# Patient Record
Sex: FEMALE | Race: OTHER | URBAN - METROPOLITAN AREA
[De-identification: names, ages, dates, MRNs, and addresses within clinical notes are randomized per-mention and may not be internally consistent; named-entity substitution may affect disease eponyms.]

---

## 2017-01-10 ENCOUNTER — IMPORTED ENCOUNTER (OUTPATIENT)
Dept: URBAN - METROPOLITAN AREA CLINIC 9 | Facility: CLINIC | Age: 82
End: 2017-01-10

## 2017-02-16 ENCOUNTER — IMPORTED ENCOUNTER (OUTPATIENT)
Dept: URBAN - METROPOLITAN AREA CLINIC 9 | Facility: CLINIC | Age: 82
End: 2017-02-16

## 2017-03-02 ENCOUNTER — IMPORTED ENCOUNTER (OUTPATIENT)
Dept: URBAN - METROPOLITAN AREA CLINIC 9 | Facility: CLINIC | Age: 82
End: 2017-03-02

## 2017-03-30 ENCOUNTER — IMPORTED ENCOUNTER (OUTPATIENT)
Dept: URBAN - METROPOLITAN AREA CLINIC 9 | Facility: CLINIC | Age: 82
End: 2017-03-30

## 2017-05-04 ENCOUNTER — IMPORTED ENCOUNTER (OUTPATIENT)
Dept: URBAN - METROPOLITAN AREA CLINIC 9 | Facility: CLINIC | Age: 82
End: 2017-05-04

## 2017-08-02 ENCOUNTER — IMPORTED ENCOUNTER (OUTPATIENT)
Dept: URBAN - METROPOLITAN AREA CLINIC 9 | Facility: CLINIC | Age: 82
End: 2017-08-02

## 2017-12-07 ENCOUNTER — IMPORTED ENCOUNTER (OUTPATIENT)
Dept: URBAN - METROPOLITAN AREA CLINIC 9 | Facility: CLINIC | Age: 82
End: 2017-12-07

## 2018-02-14 ENCOUNTER — IMPORTED ENCOUNTER (OUTPATIENT)
Dept: URBAN - METROPOLITAN AREA CLINIC 9 | Facility: CLINIC | Age: 83
End: 2018-02-14

## 2018-06-28 ENCOUNTER — IMPORTED ENCOUNTER (OUTPATIENT)
Dept: URBAN - METROPOLITAN AREA CLINIC 9 | Facility: CLINIC | Age: 83
End: 2018-06-28

## 2018-08-16 ENCOUNTER — IMPORTED ENCOUNTER (OUTPATIENT)
Dept: URBAN - METROPOLITAN AREA CLINIC 9 | Facility: CLINIC | Age: 83
End: 2018-08-16

## 2018-09-28 ENCOUNTER — IMPORTED ENCOUNTER (OUTPATIENT)
Dept: URBAN - METROPOLITAN AREA CLINIC 9 | Facility: CLINIC | Age: 83
End: 2018-09-28

## 2018-12-06 ENCOUNTER — IMPORTED ENCOUNTER (OUTPATIENT)
Dept: URBAN - METROPOLITAN AREA CLINIC 9 | Facility: CLINIC | Age: 83
End: 2018-12-06

## 2019-02-11 ENCOUNTER — IMPORTED ENCOUNTER (OUTPATIENT)
Dept: URBAN - METROPOLITAN AREA CLINIC 9 | Facility: CLINIC | Age: 84
End: 2019-02-11

## 2019-03-05 ENCOUNTER — IMPORTED ENCOUNTER (OUTPATIENT)
Dept: URBAN - METROPOLITAN AREA CLINIC 9 | Facility: CLINIC | Age: 84
End: 2019-03-05

## 2019-06-06 ENCOUNTER — IMPORTED ENCOUNTER (OUTPATIENT)
Dept: URBAN - METROPOLITAN AREA CLINIC 9 | Facility: CLINIC | Age: 84
End: 2019-06-06

## 2019-08-12 ENCOUNTER — IMPORTED ENCOUNTER (OUTPATIENT)
Dept: URBAN - METROPOLITAN AREA CLINIC 9 | Facility: CLINIC | Age: 84
End: 2019-08-12

## 2019-09-13 ENCOUNTER — IMPORTED ENCOUNTER (OUTPATIENT)
Dept: URBAN - METROPOLITAN AREA CLINIC 9 | Facility: CLINIC | Age: 84
End: 2019-09-13

## 2019-12-13 ENCOUNTER — IMPORTED ENCOUNTER (OUTPATIENT)
Dept: URBAN - METROPOLITAN AREA CLINIC 9 | Facility: CLINIC | Age: 84
End: 2019-12-13

## 2020-02-17 ENCOUNTER — IMPORTED ENCOUNTER (OUTPATIENT)
Dept: URBAN - METROPOLITAN AREA CLINIC 9 | Facility: CLINIC | Age: 85
End: 2020-02-17

## 2020-05-15 ENCOUNTER — IMPORTED ENCOUNTER (OUTPATIENT)
Dept: URBAN - METROPOLITAN AREA CLINIC 9 | Facility: CLINIC | Age: 85
End: 2020-05-15

## 2020-08-24 ENCOUNTER — IMPORTED ENCOUNTER (OUTPATIENT)
Dept: URBAN - METROPOLITAN AREA CLINIC 9 | Facility: CLINIC | Age: 85
End: 2020-08-24

## 2020-09-04 ENCOUNTER — IMPORTED ENCOUNTER (OUTPATIENT)
Dept: URBAN - METROPOLITAN AREA CLINIC 9 | Facility: CLINIC | Age: 85
End: 2020-09-04

## 2020-09-15 NOTE — PATIENT DISCUSSION
The patient was informed that with Symfony blended vision, the near focal point will be at approximately 20 inches with the first eye. Near vision will be achieved with surgery on the second eye. Side effects, specifically halos, reduced contrast, and a 1 in 500 exchange rate due to failure to adapt to the lens were discussed as was the need for enhancement in some cases. The patient elects Symfony OD ( +/- Toric), goal of emmetropia.

## 2020-09-22 NOTE — PATIENT DISCUSSION
Left message on voice mail regarding lab results below per  Renal function is stable.  Triglycerides are elevated and HDL is low.  They can improve with a decrease in simple carbs and an increase in exercise.  Will recheck them in one year-lab orders placed.    Recommended observation.

## 2020-09-28 NOTE — PATIENT DISCUSSION
Patient was due for follow up colonoscopy back in 2012.  Reminders were sent to patient.  An appointment was not made for procedure to be done. Order for colonoscopy has since  and was canceled.    Per TIO.ARIANNA Ramos, an order has been re-entered for a follow up colonoscopy to be done.  Letter mailed requesting that pateint contact Dr Ramos's  to make an appt for the procedure to be done   Recommended observation.

## 2020-10-05 NOTE — PATIENT DISCUSSION
Cataract surgery has been performed in the first eye and activities of daily living are still impaired. The patient would like to proceed with cataract surgery in the second eye as scheduled. The patient elects TMF +3.25 (+/- toric) OS, goal of emmetropia.

## 2020-11-13 ENCOUNTER — IMPORTED ENCOUNTER (OUTPATIENT)
Dept: URBAN - METROPOLITAN AREA CLINIC 9 | Facility: CLINIC | Age: 85
End: 2020-11-13

## 2020-12-16 NOTE — PATIENT DISCUSSION
This visual field clearly demonstrated a minimum of 49% loss of upper field of vision OU, with upper lid skin in repose and elevated by taping of the lid to demonstrate potential correction. This field shows that taping the lids significantly improved this patient's superior field of vision by approximately 48%, OU.

## 2021-01-21 NOTE — PATIENT DISCUSSION
Patient informed of available non-opioid medications and other non-pharmacological options. Discussed advantages and disadvantages of the alternatives, including whether the patient is at-risk for, or has a history of, controlled substance abuse or misuse, and the patient’s personal preferences. [facility] “Opioid Alternative Pamphlet” was provided to patient.

## 2021-02-08 NOTE — PATIENT DISCUSSION
Patient informed of available non-opioid medications and other non-pharmacological options. Discussed advantages and disadvantages of the alternatives, including whether the patient is at-risk for, or has a history of, controlled substance abuse or misuse, and the patient’s personal preferences. [facility] “Opioid Alternative Pamphlet” was provided to patient.

## 2021-02-08 NOTE — PROCEDURE NOTE: SURGICAL
"<p>MR #:&nbsp; [id number]<br /><br />PREOPERATIVE DIAGNOSIS:&nbsp; 1. Lower lid fat herniation and excess skin. </p><p>&nbsp; &nbsp; &nbsp; &nbsp; &nbsp; &nbsp; &nbsp; &nbsp; &nbsp; &nbsp; &nbsp; &nbsp; &nbsp; &nbsp; &nbsp; &nbsp; &nbsp; &nbsp; &nbsp; &nbsp; &nbsp; &nbsp;2. <span style=""font-size:12.0pt;font-family:'[employer]'

## 2021-02-08 NOTE — PATIENT DISCUSSION
Patient informed of available non-opioid medications and other non-pharmacological options. Discussed advantages and disadvantages of the alternatives, including whether the patient is at-risk for, or has a history of, controlled substance abuse or misuse, and the patient’s personal preferences. [facility] “Opioid Alternative Pamphlet” was provided to patient.

## 2021-03-08 ENCOUNTER — IMPORTED ENCOUNTER (OUTPATIENT)
Dept: URBAN - METROPOLITAN AREA CLINIC 9 | Facility: CLINIC | Age: 86
End: 2021-03-08

## 2021-03-12 ENCOUNTER — IMPORTED ENCOUNTER (OUTPATIENT)
Dept: URBAN - METROPOLITAN AREA CLINIC 9 | Facility: CLINIC | Age: 86
End: 2021-03-12

## 2021-03-25 ENCOUNTER — IMPORTED ENCOUNTER (OUTPATIENT)
Dept: URBAN - METROPOLITAN AREA CLINIC 9 | Facility: CLINIC | Age: 86
End: 2021-03-25

## 2021-03-25 NOTE — PATIENT DISCUSSION
Post op: great curve and shape, no infection, healing well.    Scar gel bid. Wear sunglasses and hat while outdoors. Avoid sun exposure.

## 2021-03-25 NOTE — PATIENT DISCUSSION
Healing well, no infection, no cold sores. D/C Restorative, Consult with Lia Hernandez, start medical grade sunscreen/block and Sente.

## 2021-04-20 NOTE — PATIENT DISCUSSION
Healing well, no infection, no cold sores. D/C Restorative, Consult with López Mendoza, start medical grade sunscreen/block and Sente.

## 2021-05-03 NOTE — PROCEDURE NOTE: SURGICAL
"<p style=""margin-bottom:0cm;text-align:justify;line-height:normal;""><span lang=""EN-US"">Prior to commencing surgery patient identification

## 2021-05-03 NOTE — PATIENT DISCUSSION
Post op: great curve and shape, no infection, healing well.    Scar gel bid. Wear sunglasses and hat while outdoors. Avoid sun exposure. Left arm;

## 2021-05-03 NOTE — PATIENT DISCUSSION
Healing well, no infection, no cold sores. D/C Restorative, Consult with Ana Paula Brook, start medical grade sunscreen/block and Sente.

## 2021-05-03 NOTE — PATIENT DISCUSSION
Healing well, no infection, no cold sores. D/C Restorative, Consult with Ave Car, start medical grade sunscreen/block and Sente.

## 2021-05-04 NOTE — PATIENT DISCUSSION
Healing well, no infection, no cold sores. D/C Restorative, Consult with Marley Ibarra, start medical grade sunscreen/block and Sente.

## 2021-05-04 NOTE — PATIENT DISCUSSION
One week post op: great curve and shape, no infection, healing well, sutures intact and removed, use erythromycin QHS to upper eyelids x 7-10 days. Use Vitamin E oil/Skinuva QHS x 1 month to incisions after 10 days. Wear sunglasses and hat while outdoors. Avoid sun exposure. No restrictions in 5 days. Chest pain

## 2021-05-18 NOTE — PATIENT DISCUSSION
Healing well, no infection, no cold sores. D/C Restorative, Consult with Sandoval Wynne, start medical grade sunscreen/block and Sente.

## 2021-07-22 NOTE — PATIENT DISCUSSION
+ LENSAr OU. A well visit  Rather minimal all problems are controlled  Prevention up-to-date  No new problems      Revisit yearly or as needed

## 2021-09-27 ENCOUNTER — IMPORTED ENCOUNTER (OUTPATIENT)
Dept: URBAN - METROPOLITAN AREA CLINIC 9 | Facility: CLINIC | Age: 86
End: 2021-09-27

## 2021-09-27 PROBLEM — H40.1131: Noted: 2021-09-27

## 2021-10-18 ASSESSMENT — KERATOMETRY
OD_K2POWER_DIOPTERS: 44.25
OS_K1POWER_DIOPTERS: 41.25
OD_AXISANGLE_DEGREES: 91
OS_AXISANGLE2_DEGREES: 6
OD_K2POWER_DIOPTERS: 44
OD_K1POWER_DIOPTERS: 40.5
OS_AXISANGLE2_DEGREES: 178
OD_AXISANGLE_DEGREES: 92
OD_AXISANGLE_DEGREES: 92
OS_K1POWER_DIOPTERS: 40
OD_AXISANGLE_DEGREES: 90
OS_K1POWER_DIOPTERS: 41
OS_AXISANGLE2_DEGREES: 166
OS_AXISANGLE_DEGREES: 96
OS_AXISANGLE2_DEGREES: 177
OD_K1POWER_DIOPTERS: 40.75
OD_AXISANGLE2_DEGREES: 1
OS_K2POWER_DIOPTERS: 42.75
OS_AXISANGLE_DEGREES: 76
OS_K2POWER_DIOPTERS: 44.75
OD_K2POWER_DIOPTERS: 44.25
OS_AXISANGLE_DEGREES: 87
OD_K1POWER_DIOPTERS: 40.5
OD_AXISANGLE2_DEGREES: 179
OD_AXISANGLE2_DEGREES: 2
OS_AXISANGLE_DEGREES: 88
OD_K2POWER_DIOPTERS: 44
OD_K2POWER_DIOPTERS: 44.25
OS_K2POWER_DIOPTERS: 42.5
OD_AXISANGLE2_DEGREES: 2
OD_AXISANGLE2_DEGREES: 180
OD_K1POWER_DIOPTERS: 40.75
OD_K1POWER_DIOPTERS: 40.75
OS_K2POWER_DIOPTERS: 43.75
OS_K1POWER_DIOPTERS: 40.5
OD_AXISANGLE_DEGREES: 89

## 2021-10-18 ASSESSMENT — VISUAL ACUITY
OS_CC: 20/100 SN
OS_CC: 20/40 -2 SN
OD_CC: 20/25 - SN
OD_CC: 20/25 SN
OD_SC: 20/80 - SN
OS_CC: 20/40 SN
OD_CC: 20/25 SN
OD_CC: 20/25 - SN
OD_CC: 20/25 + SN
OS_CC: 20/50 -2 SN
OS_CC: 20/50 SN
OS_CC: 20/40 - SN
OS_CC: 20/50 - SN
OS_CC: 20/40 -2 SN
OS_CC: 20/50 -2 SN
OD_CC: 20/25 SN
OD_CC: 20/25 + SN
OS_CC: 20/50 SN
OS_CC: 20/60 +2 SN
OD_CC: 20/25 +2 SN
OD_CC: 20/25 - SN
OS_CC: 20/70 - SN
OS_SC: 20/40 -2 SN
OS_CC: 20/70 + SN
OD_CC: 20/30 - SN
OS_SC: 20/70 - SN
OD_CC: 20/20 SN
OD_CC: 20/25 - SN
OS_CC: 20/60 SN
OD_CC: 20/20 SN
OS_CC: 20/60 SN
OS_CC: 20/100 -2 SN
OD_CC: 20/20 - SN
OD_CC: 20/20 SN
OS_CC: 20/60 - SN
OD_CC: 20/30 -2 SN
OD_CC: 20/25 SN
OD_CC: 20/20 -2 SN
OS_CC: 20/25 SN
OS_SC: 20/80 - SN
OS_CC: 20/50 + SN
OD_CC: 20/30 - SN
OS_CC: 20/40 -2 SN
OS_CC: 20/50 +2 SN
OS_SC: CF 10' LV
OD_CC: 20/25 - SN
OS_CC: 20/30 -2 SN
OD_CC: 20/30 + SN
OD_SC: 20/70 SN
OD_SC: 20/50 -2 SN
OD_CC: 20/20 - SN
OD_CC: 20/25 SN
OD_CC: 20/30 -2 SN
OD_SC: 20/70 - SN
OS_CC: 20/70 + SN
OS_CC: 20/25 -2 SN
OS_CC: 20/60 SN
OD_CC: 20/20 SN
OD_CC: 20/25 SN
OS_CC: 20/70 - SN
OD_CC: 20/25 - SN

## 2021-10-18 ASSESSMENT — TONOMETRY
OS_IOP_MMHG: 7
OS_IOP_MMHG: 15
OD_IOP_MMHG: 10
OD_IOP_MMHG: 14
OS_IOP_MMHG: 15
OD_IOP_MMHG: 13
OD_IOP_MMHG: 12
OD_IOP_MMHG: 12
OS_IOP_MMHG: 10
OS_IOP_MMHG: 10
OS_IOP_MMHG: 17
OD_IOP_MMHG: 11
OD_IOP_MMHG: 10
OD_IOP_MMHG: 7
OS_IOP_MMHG: 11
OS_IOP_MMHG: 8
OS_IOP_MMHG: 13
OS_IOP_MMHG: 11
OS_IOP_MMHG: 9
OD_IOP_MMHG: 19
OD_IOP_MMHG: 13
OD_IOP_MMHG: 12
OD_IOP_MMHG: 16
OS_IOP_MMHG: 10
OD_IOP_MMHG: 8
OS_IOP_MMHG: 8
OS_IOP_MMHG: 10
OS_IOP_MMHG: 14
OS_IOP_MMHG: 19
OS_IOP_MMHG: 10
OS_IOP_MMHG: 14
OS_IOP_MMHG: 10
OD_IOP_MMHG: 11
OD_IOP_MMHG: 10
OS_IOP_MMHG: 12
OS_IOP_MMHG: 19
OS_IOP_MMHG: 11
OD_IOP_MMHG: 8
OD_IOP_MMHG: 13
OD_IOP_MMHG: 10
OD_IOP_MMHG: 10
OS_IOP_MMHG: 12
OS_IOP_MMHG: 14
OD_IOP_MMHG: 14
OD_IOP_MMHG: 14
OD_IOP_MMHG: 12
OS_IOP_MMHG: 15
OD_IOP_MMHG: 15

## 2021-11-16 NOTE — PATIENT DISCUSSION
Healing well, no infection, no cold sores. D/C Restorative, Consult with Shirley Stearns, start medical grade sunscreen/block and Sente.

## 2021-12-03 NOTE — PATIENT DISCUSSION
Patient given Rx for glasses. [FreeTextEntry6] : seen in Ed with sibs on 11/12/2021\par disgnosed with Rhinoentero and RSV/ covid neg\par doing well \par still has cough

## 2022-02-07 ENCOUNTER — DIAGNOSTICS ONLY (OUTPATIENT)
Dept: URBAN - METROPOLITAN AREA CLINIC 9 | Facility: CLINIC | Age: 87
End: 2022-02-07

## 2022-02-07 DIAGNOSIS — H40.1131: ICD-10-CM

## 2022-02-07 PROCEDURE — 92083 EXTENDED VISUAL FIELD XM: CPT

## 2022-03-14 ENCOUNTER — ESTABLISHED PATIENT (OUTPATIENT)
Dept: URBAN - METROPOLITAN AREA CLINIC 9 | Facility: CLINIC | Age: 87
End: 2022-03-14

## 2022-03-14 DIAGNOSIS — H52.223: ICD-10-CM

## 2022-03-14 DIAGNOSIS — E11.9: ICD-10-CM

## 2022-03-14 DIAGNOSIS — H40.1131: ICD-10-CM

## 2022-03-14 PROCEDURE — 92133 CPTRZD OPH DX IMG PST SGM ON: CPT

## 2022-03-14 PROCEDURE — 92134 CPTRZ OPH DX IMG PST SGM RTA: CPT

## 2022-03-14 PROCEDURE — 92014 COMPRE OPH EXAM EST PT 1/>: CPT

## 2022-03-14 PROCEDURE — 92015 DETERMINE REFRACTIVE STATE: CPT

## 2022-03-14 ASSESSMENT — KERATOMETRY
OD_K2POWER_DIOPTERS: 44.50
OD_AXISANGLE2_DEGREES: 8
OD_K1POWER_DIOPTERS: 40.75
OD_AXISANGLE_DEGREES: 98

## 2022-03-14 ASSESSMENT — VISUAL ACUITY
OU_SC: 20/70-1
OD_SC: 20/70-1
OU_CC: 20/30-2
OD_CC: 20/20-2
OS_SC: 20/200
OS_CC: 20/80-1

## 2022-03-14 ASSESSMENT — TONOMETRY
OS_IOP_MMHG: 13
OD_IOP_MMHG: 11

## 2022-05-19 NOTE — PATIENT DISCUSSION
Healing well, no infection, no cold sores. D/C Restorative, Consult with Josh Whitman, start medical grade sunscreen/block and Sente.

## 2022-07-06 RX ORDER — METFORMIN HYDROCHLORIDE 500 MG/1
TABLET, EXTENDED RELEASE ORAL
COMMUNITY
End: 2022-09-26 | Stop reason: SDUPTHER

## 2022-07-06 RX ORDER — BIMATOPROST 0.01 %
DROPS OPHTHALMIC (EYE)
COMMUNITY

## 2022-09-22 ENCOUNTER — FOLLOW UP (OUTPATIENT)
Dept: URBAN - METROPOLITAN AREA CLINIC 9 | Facility: CLINIC | Age: 87
End: 2022-09-22

## 2022-09-22 DIAGNOSIS — H40.1131: ICD-10-CM

## 2022-09-22 PROCEDURE — 99213 OFFICE O/P EST LOW 20 MIN: CPT

## 2022-09-22 ASSESSMENT — TONOMETRY
OD_IOP_MMHG: 12
OS_IOP_MMHG: 14

## 2022-09-22 ASSESSMENT — VISUAL ACUITY
OS_CC: 20/80-1
OD_CC: 20/25-1

## 2022-11-03 PROBLEM — E11.9 TYPE 2 DIABETES MELLITUS WITHOUT COMPLICATION (HCC): Status: ACTIVE | Noted: 2022-11-03

## 2022-11-03 PROBLEM — R63.0 DECREASED APPETITE: Status: ACTIVE | Noted: 2022-11-03

## 2022-11-03 PROBLEM — N95.1 MENOPAUSAL STATE: Status: ACTIVE | Noted: 2022-11-03

## 2022-11-03 PROBLEM — D50.9 IRON DEFICIENCY ANEMIA: Status: ACTIVE | Noted: 2022-11-03

## 2022-11-03 PROBLEM — N95.9 MENOPAUSAL AND PERIMENOPAUSAL DISORDER: Status: ACTIVE | Noted: 2022-11-03

## 2022-11-03 PROBLEM — G47.9 DIFFICULTY SLEEPING: Status: ACTIVE | Noted: 2022-11-03

## 2022-11-03 PROBLEM — H40.9 GLAUCOMA: Status: ACTIVE | Noted: 2022-11-03

## 2022-11-14 NOTE — PATIENT DISCUSSION
Healing well, no infection, no cold sores. D/C Restorative, Consult with Vianca Leigh, start medical grade sunscreen/block and Sente.

## 2022-11-29 NOTE — PATIENT DISCUSSION
My Depression Action Plan  Name: Gibson Villalta   Date of Birth 1941  Date: 8/9/2018    My doctor: Flori Reyes   My clinic: [address] [id number] [phone]          GREEN    ZONE   Good Control    What it looks like:     Things are going generally well. You have normal up s and down s. You may even feel depressed from time to time, but bad moods usually last less than a day.   What you need to do:  1. Continue to care for yourself (see self care plan)  2. Check your depression survival kit and update it as needed  3. Follow your physician s recommendations including any medication.  4. Do not stop taking medication unless you consult with your physician first.           YELLOW         ZONE Getting Worse    What it looks like:     Depression is starting to interfere with your life.     It may be hard to get out of bed; you may be starting to isolate yourself from others.    Symptoms of depression are starting to last most all day and this has happened for several days.     You may have suicidal thoughts but they are not constant.   What you need to do:     1. Call your care team, your response to treatment will improve if you keep your care team informed of your progress. Yellow periods are signs an adjustment may need to be made.     2. Continue your self-care, even if you have to fake it!    3. Talk to someone in your support network    4. Open up your depression survival kit           RED    ZONE Medical Alert - Get Help    What it looks like:     Depression is seriously interfering with your life.     You may experience these or other symptoms: You can t get out of bed most days, can t work or engage in other necessary activities, you have trouble taking care of basic hygiene, or basic responsibilities, thoughts of suicide or death that will not go away, self-injurious behavior.     What you need to do:  1. Call your care team and  Recommend Full face CO2 laser (discussed risks and benefits of sx. .. ). request a same-day appointment. If they are not available (weekends or after hours) call your local crisis line, emergency room or 911.            Depression Self Care Plan / Survival Kit    Self-Care for Depression  Here s the deal. Your body and mind are really not as separate as most people think.  What you do and think affects how you feel and how you feel influences what you do and think. This means if you do things that people who feel good do, it will help you feel better.  Sometimes this is all it takes.  There is also a place for medication and therapy depending on how severe your depression is, so be sure to consult with your medical provider and/ or Behavioral Health Consultant if your symptoms are worsening or not improving.     In order to better manage my stress, I will:    Exercise  Get some form of exercise, every day. This will help reduce pain and release endorphins, the  feel good  chemicals in your brain. This is almost as good as taking antidepressants!  This is not the same as joining a gym and then never going! (they count on that by the way ) It can be as simple as just going for a walk or doing some gardening, anything that will get you moving.      Hygiene   Maintain good hygiene (Get out of bed in the morning, Make your bed, Brush your teeth, Take a shower, and Get dressed like you were going to work, even if you are unemployed).  If your clothes don't fit try to get ones that do.    Diet  I will strive to eat foods that are good for me, drink plenty of water, and avoid excessive sugar, caffeine, alcohol, and other mood-altering substances.  Some foods that are helpful in depression are: complex carbohydrates, B vitamins, flaxseed, fish or fish oil, fresh fruits and vegetables.    Psychotherapy  I agree to participate in Individual Therapy (if recommended).    Medication  If prescribed medications, I agree to take them.  Missing doses can result in serious side effects.  I understand that  drinking alcohol, or other illicit drug use, may cause potential side effects.  I will not stop my medication abruptly without first discussing it with my provider.    Staying Connected With Others  I will stay in touch with my friends, family members, and my primary care provider/team.    Use your imagination  Be creative.  We all have a creative side; it doesn t matter if it s oil painting, sand castles, or mud pies! This will also kick up the endorphins.    Witness Beauty  (AKA stop and smell the roses) Take a look outside, even in mid-winter. Notice colors, textures. Watch the squirrels and birds.     Service to others  Be of service to others.  There is always someone else in need.  By helping others we can  get out of ourselves  and remember the really important things.  This also provides opportunities for practicing all the other parts of the program.    Humor  Laugh and be silly!  Adjust your TV habits for less news and crime-drama and more comedy.    Control your stress  Try breathing deep, massage therapy, biofeedback, and meditation. Find time to relax each day.     My support system    Clinic Contact:  Phone number:    Contact 1:  Phone number:    Contact 2:  Phone number:    Sikh/:  Phone number:    Therapist:  Phone number:    Local crisis center:    Phone number:    Other community support:  Phone number:

## 2022-11-29 NOTE — PATIENT DISCUSSION
Healing well, no infection, no cold sores. D/C Restorative, Consult with Leisa Be, start medical grade sunscreen/block and Sente.

## 2022-11-29 NOTE — PATIENT DISCUSSION
Non-resolving. May need biopsy. Resume hot compresses. If no improvement, call and will schedule with KK.

## 2022-12-06 NOTE — PATIENT DISCUSSION
Discussed condition and exacerbating conditions/situations (e.g., dry/arid environments, overhead fans, air conditioners, side effect of medications). normal...

## 2022-12-21 NOTE — PATIENT DISCUSSION
Healing well, no infection, no cold sores. D/C Restorative, Consult with Janki uDtton, start medical grade sunscreen/block and Sente.

## 2022-12-21 NOTE — PATIENT DISCUSSION
Date of Service:  9/4/2019    Chief Complaint: Benign prostatic hyperplasia/LUTS    History of Present Illness: The patient is an 85 year old male who was last seen in the office on 12/20/2018.    He was seen by Dr. Dunham in the past. He has a history of BPH and was on Flomax in the past. He is status post green light laser in March 2007. In November 2018, patient had recurrent LUTS including urge incontinence and weak flow. He was restarted on Flomax once daily with no significant improvement. His most bothersome symptom was nocturia.     He also has a history of kidney stones. Patient believed he passed a stone in 2015. CT at that time revealed punctate, bilateral nonobstructing stones without hydronephrosis. Patient also has a history of epididymitis which has been treated with antibiotics. He has also been treated for prostatitis.    Since his last visit his wife has passed away. He has noticed occasionally get a partial erection and is starting to Court . Is not able to maintain an erection. He is not on nitroglycerin tablets.    He returns to the office today no longer taking Flomax. He has no new voiding complaints. He states he \"does not have a problem urinating at all.\" No recent stone events. No gross hematuria. His current voiding complaints include frequency, urgency, weak stream, and nocturia x2. His AUA score is 8 with a bother score of 2.    PFSH:   Past Medical History:   Past Medical History:   Diagnosis Date   • Diabetes mellitus (CMS/[facility])    • Essential (primary) hypertension    • Heart disease    • High cholesterol        Family History:  No family history on file.    Surgical History:  No past surgical history on file.:    Social History:  Social History     Socioeconomic History   • Marital status: /Civil Union     Spouse name: Not on file   • Number of children: Not on file   • Years of education: Not on file   • Highest education level: Not on file   Occupational History   • Not on  Reassess for possible Lasik with Dr. Bridgette Norman after YAG. file   Social Needs   • Financial resource strain: Not on file   • Food insecurity:     Worry: Not on file     Inability: Not on file   • Transportation needs:     Medical: Not on file     Non-medical: Not on file   Tobacco Use   • Smoking status: Never Smoker   • Smokeless tobacco: Never Used   Substance and Sexual Activity   • Alcohol use: Yes     Comment: occ.   • Drug use: Not on file   • Sexual activity: Not on file   Lifestyle   • Physical activity:     Days per week: Not on file     Minutes per session: Not on file   • Stress: Not on file   Relationships   • Social connections:     Talks on phone: Not on file     Gets together: Not on file     Attends Mosque service: Not on file     Active member of club or organization: Not on file     Attends meetings of clubs or organizations: Not on file     Relationship status: Not on file   • Intimate partner violence:     Fear of current or ex partner: Not on file     Emotionally abused: Not on file     Physically abused: Not on file     Forced sexual activity: Not on file   Other Topics Concern   • Not on file   Social History Narrative   • Not on file       Allergies:  ALLERGIES:  Not on File    Medications:  Current Outpatient Medications   Medication Sig Dispense Refill   • enalapril (VASOTEC) 20 MG tablet Take 20 mg by mouth daily.     • simvastatin (ZOCOR) 40 MG tablet Take 40 mg by mouth nightly.     • metoPROLOL succinate (TOPROL-XL) 50 MG 24 hr tablet Take 50 mg by mouth daily.     • clopidogrel (PLAVIX) 75 MG tablet Take 75 mg by mouth daily.     • pantoprazole (PROTONIX) 40 MG tablet Take 40 mg by mouth daily.     • amLODIPine (NORVASC) 10 MG tablet Take 10 mg by mouth daily.     • aspirin 81 MG tablet Take 81 mg by mouth daily.     • tamsulosin (FLOMAX) 0.4 MG Cap Take 1 capsule by mouth daily after a meal. 30 capsule 11     No current facility-administered medications for this visit.      Review of Systems:  Constitutional:  Patient denies fever, chills  or headache.  Eyes:  Patient denies blurred vision, double vision, pain, or glaucoma.  Allergic/Immunologic:  Patient denies hay fever or drug allergies.  Neurological:  Patient denies tremors, dizzy spells, numbness/tingling or seizures.  Endocrine:  Patient denies excessive thirst, too hot, too cold, tired/sluggish or thyroid disease.  Gastrointestinal:  Patient denies abdominal pain, nausea, vomiting, indigestion/heartburn, diarrhea or constipation.  Cardiovascular:  Patient denies chest pain, varicose veins, high blood pressure or heart valve problems.  Integumentary:  Patient denies skin rash, boils or persistent itch.  Musculoskeletal:  Patient denies joint pain, neck pain, back pain or joint replacement.  Ear/Nose/Throat/Mouth:  Patient denies ear infection, sore throat or sinus problems.  Genitourinary:  See symptoms listed in HPI.  Respiratory:  Patient denies wheezing, frequent coughing, shortness of breath or chronic obstructive pulmonary disease.  Hematologic/Lymphatic:  Patient denies swollen glands or blood clotting problem.  Psychologic:  Patient states satisfaction with life and denies depression or suicidal ideations.     Physical Exam:  Visit Vitals  /81   Pulse 60   Ht 5' 8\" (1.727 m)   Wt 69.4 kg   BMI 23.26 kg/m²     Constitutional:  Well developed, well nourished, afebrile.    UROFLOWMETRY    Reason for Uroflow: BPH     Amount Voided: 62 cc   Time to Maximum Flow: 5.1 sec   Flow Time: 11.3 sec   Maximum Flow Rate: 11.3 cc/sec   Average Flow Rate: 5.5 cc/sec   Representative Voiding?: Yes No  UFR Tracing: bel  Residual Urine: 8 cc    In-Office Testing  Results for orders placed or performed in visit on 09/04/19   POST VOID RES URINE/BL BY US   Result Value    BLDR Scan mLs 8ml   POCT URINALYSIS DIPSTICK   Result Value    POCT Color     POCT Appearance     POCT Glucose Urine Negative    POCT Bilirubin Negative    POCT Ketones Negative    POCT Specific Gravity 1.020    POCT Occult Blood  Negative    POCT pH 6.5    POCT Protein Negative    POCT Urobilinogen 0.2    Urine Nitrite Negative    WBC (Leukocyte) Esterase POC Negative     Assessment and Plan:      Erectile dysfunction- give Sildenafil 100 mg 1/2-1 tablet as directed #10 with 3 refills. Follow up in 6 weeks.    Erectile Dysfunction:  Discussed with the patient concerning erectile dysfunction. I explained that the erection problem could be caused from aging, cardiovascular problems, Peyronie's disease, emotional, or neurological problems. I explained the treatment with certain medications such as Levitra, Cialis, or Viagra and the mechanism of action of these medications. The patient expressed an understanding of this explanation.          , Benign prostatic hyperplasia/LUTS- status post green light laser in 2007. Symptoms mild and stable, off Flomax. Patient is content. Uroflow normal for amount voided. Bladder emptying well. Discussed options. No further treatment necessary.       I had a brief counseling session with the patient concerning the diagnosis and treatment options.     I performed a brief review of the patient's medical records.     Data review:  Symptom Score Reviewed  I reviewed the patient's AUA symptom score and reviewed the findings with the patient including diagnosis, prognosis, treatment  Urinalysis Review  I reviewed the Urinalysis data with patient, including diagnosis, prognosis, and treatment.    Urodynamics Review:  I reviewed the Urodynamic examination with patient, including diagnosis, prognosis, and treatment.      On 9/4/2019, IIngrid scribed the services personally performed by Jey Padilla Jr., MD    The documentation recorded by the scribe accurately and completely reflects the service(s) I personally performed and the decisions made by me.      Jey Padilla Jr. MD

## 2022-12-21 NOTE — PATIENT DISCUSSION
Healing well, no infection, no cold sores. D/C Restorative, Consult with [facility], start medical grade sunscreen/block and Sente.

## 2022-12-21 NOTE — PATIENT DISCUSSION
Very slow to resolve. Discussed removal. May need biopsy. Resume hot compresses. If no improvement, call and will schedule with KK.

## 2023-11-01 ENCOUNTER — FOLLOW UP (OUTPATIENT)
Facility: LOCATION | Age: 88
End: 2023-11-01

## 2023-11-01 DIAGNOSIS — H40.1131: ICD-10-CM

## 2023-11-01 PROCEDURE — 99213 OFFICE O/P EST LOW 20 MIN: CPT

## 2023-11-01 ASSESSMENT — VISUAL ACUITY
OD_CC: 20/20-2
OS_CC: 20/80

## 2023-11-01 ASSESSMENT — TONOMETRY
OD_IOP_MMHG: 14
OS_IOP_MMHG: 15

## 2024-08-23 NOTE — PATIENT DISCUSSION
Avoid sun exposure.
Discussed condition and exacerbating conditions/situations (e.g., dry/arid environments, overhead fans, air conditioners, side effect of medications).
Discussed lid hygiene, warm compress with Derm mask and eyelid wash with Ocusoft foam.
Explained potential side effects. Use probiotics.
FULL FACE LASER, Healing well. no infection no sores.
Glasses Rx given.
Good postoperative appearance.
Healing well, no infection, no cold sores. D/C Restorative, Consult with Kellee Lowry, start medical grade sunscreen/block and Sente.
Hot compresses.
Instruction sheet given.
Keflex 500mg BID x 1 week.
Lids/Brow in good position.
Monitor.
Observe.
One week post op: great curve and shape, no infection, healing well, sutures intact and removed, use erythromycin QHS to upper eyelids x 7-10 days. Use Vitamin E oil/Skinuva QHS x 1 month to incisions after 10 days. Wear sunglasses and hat while outdoors. Avoid sun exposure. No restrictions in 5 days.
One week post op: great curve and shape, no infection, healing well. Avoid sun exposure.
One week post op: lids in good position, no infection, healing well, use erythromycin QHS to lower eyelids and Tobradex QHS x 7-10 days. Wear sunglasses and hat while outdoors. Avoid sun exposure. No restrictions in 5 days.
Patient given Rx for glasses.
Patient made aware of 24/7 emergency services.
Patient understands condition, prognosis and need for follow up care.
Patient understands there is an increased risk of corneal edema after cataract surgery.
Post op: great curve and shape, no infection, healing well.    Scar gel bid. Wear sunglasses and hat while outdoors. Avoid sun exposure.
Reassess for possible Lasik with Dr. Bridgette Norman after YAG.
Reassess for possible Lasik with Dr. José Luis Mendez after YAG.
Recommend Bilateral lower lid blepharoplasty trans conj laser (discussed risks and benefits of sx. .. ).
Recommend Bilateral upper lid blepharoplasty. predeterm(discussed risks and benefits of sx. .. ).
Recommend Full face CO2 laser (discussed risks and benefits of sx. .. ).
Recommend Upper lip CO2 laser (discussed risks and benefits of sx. .. ).
Recommend ultra x 2cc's of Juvederm (discussed risks and benefits. .. ).
Recommended artificial tears to use: 1 drop 4x a day in both eyes.
Recommended observation.
Retinal tear and detachment warning symptoms reviewed and patient instructed to call immediately if increasing floaters, flashes, or decreasing peripheral vision.
Second episode. Rx oral AB.
Tip: Hydropeel Tip, Clear
Wear sunblock.
Wear sunglasses while outdoors.
Wear sunglasses/hats while outdoors.
Well healed.
Treatment Number: 1
Solution Override
Vacuum Pressure High Setting (Will Not Render If Set To 0): 0
Procedure: Peel
Tip: Hydropeel Tip, Teal
Procedure: Boost
Indication: skin texture
Procedure: Extraction
Procedure: Fusion
Procedure: Exfoliation
Location: face
Solution: GlySal 7.5%
Tip Override
Solution: Beta-HD
Solution: Activ-4
Tip: Hydropeel Tip, Blue
Consent: Written consent obtained, risks reviewed including but not limited to crusting, scabbing, blistering, scarring, darker or lighter pigmentary change, bruising, and/or incomplete response.
Procedure: Extend and Protect
Post-Care Instructions: I reviewed with the patient in detail post-care instructions. Patient should stay away from the sun and wear sun protection until treated areas are fully healed.

## 2024-09-05 NOTE — PATIENT DISCUSSION
Avoid sun exposure. Follow-up with your primary care physician in one week if symptoms have not improved or symptoms are starting to get worse.  Increase fluids, keep well-hydrated.  Take Tylenol and Motrin for fever and pain.  Eat and drink anything that is soothing to the back of the throat.  Gargle with warm salt water, throat lozenges will be helpful.

## 2025-07-21 ENCOUNTER — COMPREHENSIVE EXAM (OUTPATIENT)
Age: OVER 89
End: 2025-07-21

## 2025-07-21 DIAGNOSIS — H26.491: ICD-10-CM

## 2025-07-21 DIAGNOSIS — H04.123: ICD-10-CM

## 2025-07-21 DIAGNOSIS — H17.12: ICD-10-CM

## 2025-07-21 DIAGNOSIS — H40.1131: ICD-10-CM

## 2025-07-21 DIAGNOSIS — H52.223: ICD-10-CM

## 2025-07-21 DIAGNOSIS — E11.9: ICD-10-CM

## 2025-07-21 PROCEDURE — 92133 CPTRZD OPH DX IMG PST SGM ON: CPT

## 2025-07-21 PROCEDURE — 92015 DETERMINE REFRACTIVE STATE: CPT

## 2025-07-21 PROCEDURE — 92014 COMPRE OPH EXAM EST PT 1/>: CPT
